# Patient Record
Sex: FEMALE | Race: BLACK OR AFRICAN AMERICAN | NOT HISPANIC OR LATINO | Employment: OTHER | URBAN - METROPOLITAN AREA
[De-identification: names, ages, dates, MRNs, and addresses within clinical notes are randomized per-mention and may not be internally consistent; named-entity substitution may affect disease eponyms.]

---

## 2019-05-28 PROBLEM — L23.7 POISON IVY DERMATITIS: Status: ACTIVE | Noted: 2019-05-28

## 2019-05-28 PROBLEM — E78.2 MIXED HYPERLIPIDEMIA: Status: ACTIVE | Noted: 2019-05-28

## 2019-05-28 PROBLEM — I10 ESSENTIAL HYPERTENSION: Status: ACTIVE | Noted: 2019-05-28

## 2019-05-28 PROBLEM — F41.9 ANXIETY: Status: ACTIVE | Noted: 2019-05-28

## 2021-04-21 DIAGNOSIS — Z12.11 COLON CANCER SCREENING: ICD-10-CM

## 2021-04-21 DIAGNOSIS — Z11.59 NEED FOR HEPATITIS C SCREENING TEST: ICD-10-CM

## 2021-04-21 DIAGNOSIS — Z12.31 OTHER SCREENING MAMMOGRAM: ICD-10-CM

## 2022-01-25 PROBLEM — H93.8X3 SENSATION OF FULLNESS IN BOTH EARS: Status: ACTIVE | Noted: 2022-01-25

## 2022-01-25 PROBLEM — Z98.890 HISTORY OF LOOP RECORDER: Status: ACTIVE | Noted: 2022-01-25

## 2022-04-14 ENCOUNTER — PES CALL (OUTPATIENT)
Dept: ADMINISTRATIVE | Facility: CLINIC | Age: 62
End: 2022-04-14

## 2022-05-06 ENCOUNTER — PES CALL (OUTPATIENT)
Dept: ADMINISTRATIVE | Facility: CLINIC | Age: 62
End: 2022-05-06

## 2023-01-06 PROBLEM — L60.8 PINCER NAIL DEFORMITY: Status: ACTIVE | Noted: 2023-01-06

## 2023-03-22 ENCOUNTER — PATIENT OUTREACH (OUTPATIENT)
Dept: ADMINISTRATIVE | Facility: HOSPITAL | Age: 63
End: 2023-03-22

## 2023-06-13 ENCOUNTER — PATIENT OUTREACH (OUTPATIENT)
Dept: ADMINISTRATIVE | Facility: HOSPITAL | Age: 63
End: 2023-06-13

## 2023-10-11 ENCOUNTER — EXTERNAL HOSPITAL ADMISSION (OUTPATIENT)
Dept: ADMINISTRATIVE | Facility: CLINIC | Age: 63
End: 2023-10-11

## 2023-10-11 ENCOUNTER — PATIENT OUTREACH (OUTPATIENT)
Dept: ADMINISTRATIVE | Facility: CLINIC | Age: 63
End: 2023-10-11

## 2023-10-11 NOTE — PROGRESS NOTES
C3 nurse attempted to contact Chhaya Sanchez for a TCC post hospital discharge follow up call. No answer. No voicemail available. The patient has a scheduled [facility] appointment with Didier Benavidez MD (PCP) 10/19/23 @ 9:00am.

## 2023-10-17 ENCOUNTER — PATIENT OUTREACH (OUTPATIENT)
Dept: ADMINISTRATIVE | Facility: CLINIC | Age: 63
End: 2023-10-17

## 2023-10-17 ENCOUNTER — EXTERNAL HOSPITAL ADMISSION (OUTPATIENT)
Dept: ADMINISTRATIVE | Facility: CLINIC | Age: 63
End: 2023-10-17

## 2023-10-17 NOTE — PROGRESS NOTES
C3 nurse spoke with Chhaya Sanchez's daughter, Jhonatan, for a TCC post hospital discharge follow up call. [facility] (PCP) 10/19/23 @ 9:00am. She also has a pending appointment with the Cardiologist that she saw at [facility].

## 2023-10-19 ENCOUNTER — SOCIAL WORK (OUTPATIENT)
Dept: ADMINISTRATIVE | Facility: OTHER | Age: 63
End: 2023-10-19

## 2023-10-19 NOTE — PROGRESS NOTES
Pt has started a new enrollment for Elquis through PAP. A doctor signature and script is needed. In basket doctor regarding this matter. Pt needs to provide proof of income to move forward with PAP enrollment. Pt has agreed to do so. Once the requested items are received, the pap application will be submitted.    Francy Gunter    [phone]

## 2023-10-20 ENCOUNTER — SOCIAL WORK (OUTPATIENT)
Dept: ADMINISTRATIVE | Facility: OTHER | Age: 63
End: 2023-10-20

## 2023-10-20 NOTE — PROGRESS NOTES
A doctor signature has been received on the pap application for [place]. Pt needs to provide proof of income to move forward with PAP enrollment. Pt has agreed to so. Once the requested items are received, the pap application will be submitted.    Francy [facility]    [phone]

## 2024-01-11 DIAGNOSIS — Z00.00 ENCOUNTER FOR MEDICARE ANNUAL WELLNESS EXAM: ICD-10-CM

## 2024-04-30 ENCOUNTER — SOCIAL WORK (OUTPATIENT)
Dept: ADMINISTRATIVE | Facility: OTHER | Age: 64
End: 2024-04-30

## 2024-04-30 NOTE — PROGRESS NOTES
Pt called requesting a status update on Elqulis through PAP. According to [employer], pt needs to renew her enrollment for 2024. Pt has agreed to do so and bring the requested documents.       Francy Gunter    [phone]  [phone] Fax

## 2024-05-01 ENCOUNTER — SOCIAL WORK (OUTPATIENT)
Dept: ADMINISTRATIVE | Facility: OTHER | Age: 64
End: 2024-05-01

## 2024-05-01 NOTE — PROGRESS NOTES
Pt has started a new enrollment for Elquis through PAP. A doctor signature and script is needed. In basket doctor. Once the doctor signature is received, the pap application will be submitted.       Francy Gunter    [phone]   [phone] Fax

## 2024-05-06 ENCOUNTER — SOCIAL WORK (OUTPATIENT)
Dept: ADMINISTRATIVE | Facility: OTHER | Age: 64
End: 2024-05-06

## 2024-05-06 NOTE — PROGRESS NOTES
A doctor signature has been received, a script on Eliquis is needed in order to move forward with PAP enrollment. IN basket doctor regarding script. Once the script is received, the PAP application will be submitted. Pt has updated on enrolment status.       Francy Gunter    [phone]   [phone] Fax

## 2024-05-09 ENCOUNTER — SOCIAL WORK (OUTPATIENT)
Dept: ADMINISTRATIVE | Facility: OTHER | Age: 64
End: 2024-05-09

## 2024-05-09 NOTE — PROGRESS NOTES
A script has been received, the PAP application has been submitted. A decision will be made in 5 to 7 days. Pt has been updated on enrollment.         Francy Gunter    [phone]   [phone] Fax

## 2024-05-24 ENCOUNTER — SOCIAL WORK (OUTPATIENT)
Dept: ADMINISTRATIVE | Facility: OTHER | Age: 64
End: 2024-05-24

## 2024-05-24 NOTE — PROGRESS NOTES
Pt is eligible to receive Elqulis free of charge from 5/23/2024 through 5/22/2025.  The medication will be delivered in 5 to 7 days. Pt has been notified of outcome.      [facility]  [phone]   [phone] Fax

## 2025-04-04 ENCOUNTER — PATIENT OUTREACH (OUTPATIENT)
Dept: ADMINISTRATIVE | Facility: HOSPITAL | Age: 65
End: 2025-04-04